# Patient Record
Sex: FEMALE | Race: WHITE | NOT HISPANIC OR LATINO | Employment: STUDENT | URBAN - METROPOLITAN AREA
[De-identification: names, ages, dates, MRNs, and addresses within clinical notes are randomized per-mention and may not be internally consistent; named-entity substitution may affect disease eponyms.]

---

## 2022-08-28 ENCOUNTER — OFFICE VISIT (OUTPATIENT)
Dept: URGENT CARE | Facility: URGENT CARE | Age: 27
End: 2022-08-28
Payer: COMMERCIAL

## 2022-08-28 VITALS
BODY MASS INDEX: 27.58 KG/M2 | HEART RATE: 76 BPM | HEIGHT: 68 IN | WEIGHT: 182 LBS | DIASTOLIC BLOOD PRESSURE: 83 MMHG | SYSTOLIC BLOOD PRESSURE: 116 MMHG | TEMPERATURE: 99 F

## 2022-08-28 DIAGNOSIS — S61.311A LACERATION OF LEFT INDEX FINGER WITHOUT FOREIGN BODY WITH DAMAGE TO NAIL, INITIAL ENCOUNTER: Primary | ICD-10-CM

## 2022-08-28 PROCEDURE — 12001 RPR S/N/AX/GEN/TRNK 2.5CM/<: CPT | Mod: F1 | Performed by: INTERNAL MEDICINE

## 2022-08-28 RX ORDER — NORGESTIMATE AND ETHINYL ESTRADIOL 0.25-0.035
KIT ORAL
COMMUNITY
Start: 2022-07-11

## 2022-08-28 RX ORDER — TRIAMCINOLONE ACETONIDE 1 MG/G
OINTMENT TOPICAL
COMMUNITY
Start: 2022-07-13

## 2022-08-28 RX ORDER — CETIRIZINE HYDROCHLORIDE 10 MG/1
CAPSULE, LIQUID FILLED ORAL
COMMUNITY

## 2022-08-28 RX ORDER — CLOBETASOL PROPIONATE 0.5 MG/G
OINTMENT TOPICAL
COMMUNITY
Start: 2022-07-13

## 2022-08-28 NOTE — PROGRESS NOTES
"SUBJECTIVE:  This 27 year old female presents with an injury to the tip of her left index finger.  She accidentally sliced the fingertip with a knife while cutting a bagel several hours ago.    OBJECTIVE:  /83 (BP Location: Right arm, Patient Position: Sitting, Cuff Size: Adult Large)   Pulse 76   Temp 99  F (37.2  C) (Tympanic)   Ht 1.715 m (5' 7.5\")   Wt 82.6 kg (182 lb)   BMI 28.08 kg/m    GEN: young adult female, no apparent distress   LEFT HAND: there is a 1 cm superficial avulsion flap involving the tip of the index finger with a partial transection of the distal portion of the fingernail    ASSESSMENT/PLAN:    ICD-10-CM    1. Laceration of left index finger without foreign body with damage to nail, initial encounter  S61.311A      The avulsion flap is easily re-approximated and secured with Exofin skin adhesive.  The partially transected nail fragment is also easily re-approximated and secured with Exofin skin adhesive.    Curt Harris MD   "

## 2024-01-30 ENCOUNTER — HOSPITAL ENCOUNTER (OUTPATIENT)
Facility: CLINIC | Age: 29
End: 2024-01-30
Attending: OBSTETRICS & GYNECOLOGY | Admitting: OBSTETRICS & GYNECOLOGY
Payer: COMMERCIAL